# Patient Record
Sex: FEMALE | Race: WHITE | NOT HISPANIC OR LATINO | Employment: OTHER | URBAN - METROPOLITAN AREA
[De-identification: names, ages, dates, MRNs, and addresses within clinical notes are randomized per-mention and may not be internally consistent; named-entity substitution may affect disease eponyms.]

---

## 2019-06-13 ENCOUNTER — TRANSFERRED RECORDS (OUTPATIENT)
Dept: HEALTH INFORMATION MANAGEMENT | Facility: CLINIC | Age: 84
End: 2019-06-13

## 2019-06-24 ENCOUNTER — TELEPHONE (OUTPATIENT)
Dept: OPHTHALMOLOGY | Facility: CLINIC | Age: 84
End: 2019-06-24

## 2019-06-24 NOTE — TELEPHONE ENCOUNTER
Spoke with patient to set up new glaucoma eval, and was directed to pt's daughter to select dates.   Left message with daughter, gave glaucoma desk call back number. Looking at Monday, July 8 or 15.  Huma Beard COA 2:38 PM June 24, 2019

## 2019-07-22 ENCOUNTER — OFFICE VISIT (OUTPATIENT)
Dept: OPHTHALMOLOGY | Facility: CLINIC | Age: 84
End: 2019-07-22
Attending: OPHTHALMOLOGY
Payer: COMMERCIAL

## 2019-07-22 DIAGNOSIS — H40.1433 CAPSULAR GLAUCOMA OF BOTH EYES WITH PSEUDOEXFOLIATION (PXF) OF LENS, SEVERE STAGE: Primary | ICD-10-CM

## 2019-07-22 PROCEDURE — 92083 EXTENDED VISUAL FIELD XM: CPT | Mod: ZF | Performed by: OPHTHALMOLOGY

## 2019-07-22 PROCEDURE — G0463 HOSPITAL OUTPT CLINIC VISIT: HCPCS | Mod: ZF

## 2019-07-22 PROCEDURE — 92020 GONIOSCOPY: CPT | Mod: ZF | Performed by: OPHTHALMOLOGY

## 2019-07-22 PROCEDURE — 92133 CPTRZD OPH DX IMG PST SGM ON: CPT | Mod: ZF | Performed by: OPHTHALMOLOGY

## 2019-07-22 RX ORDER — BRIMONIDINE TARTRATE, TIMOLOL MALEATE 2; 5 MG/ML; MG/ML
SOLUTION/ DROPS OPHTHALMIC
COMMUNITY
Start: 2019-06-08 | End: 2019-09-30

## 2019-07-22 RX ORDER — CHLORAL HYDRATE 500 MG
2 CAPSULE ORAL DAILY
COMMUNITY

## 2019-07-22 RX ORDER — BIMATOPROST 0.1 MG/ML
SOLUTION/ DROPS OPHTHALMIC
COMMUNITY
Start: 2019-07-08

## 2019-07-22 RX ORDER — AMLODIPINE BESYLATE 5 MG/1
5 TABLET ORAL DAILY
COMMUNITY

## 2019-07-22 RX ORDER — ERYTHROMYCIN 5 MG/G
1 OINTMENT OPHTHALMIC 3 TIMES DAILY
COMMUNITY
Start: 2019-07-08

## 2019-07-22 ASSESSMENT — REFRACTION_WEARINGRX
OS_ADD: +2.50
OS_AXIS: 155
OS_CYLINDER: +1.00
OD_ADD: +2.50
OS_SPHERE: -1.50
OD_AXIS: 145
OD_CYLINDER: +1.00
OD_SPHERE: -1.25

## 2019-07-22 ASSESSMENT — VISUAL ACUITY
CORRECTION_TYPE: GLASSES
METHOD: SNELLEN - LINEAR
OS_CC: 20/400 ECC
OD_CC: 20/500 ECC

## 2019-07-22 ASSESSMENT — CONF VISUAL FIELD
OS_SUPERIOR_NASAL_RESTRICTION: 3
OS_INFERIOR_NASAL_RESTRICTION: 3
OD_SUPERIOR_TEMPORAL_RESTRICTION: 2
OS_SUPERIOR_TEMPORAL_RESTRICTION: 3
OS_INFERIOR_TEMPORAL_RESTRICTION: 3
OD_SUPERIOR_NASAL_RESTRICTION: 1
OD_INFERIOR_TEMPORAL_RESTRICTION: 1
OD_INFERIOR_NASAL_RESTRICTION: 1

## 2019-07-22 ASSESSMENT — SLIT LAMP EXAM - LIDS
COMMENTS: NORMAL
COMMENTS: NORMAL

## 2019-07-22 ASSESSMENT — GONIOSCOPY
OS_NASAL: OPEN WITH MILD PIGMENT
OD_TEMPORAL: OPEN WITH MILD PIGMENT
OS_SUPERIOR: OPEN WITH MILD PIGMENT
OS_INFERIOR: OPEN WITH MILD PIGMENT
OD_NASAL: OPEN WITH MILD PIGMENT
OD_SUPERIOR: OPEN WITH MILD PIGMENT
OD_INFERIOR: OPEN WITH MILD PIGMENT
OS_TEMPORAL: OPEN WITH MILD PIGMENT

## 2019-07-22 ASSESSMENT — TONOMETRY
IOP_METHOD: APPLANATION
OS_IOP_MMHG: 24
OD_IOP_MMHG: 16

## 2019-07-22 ASSESSMENT — PACHYMETRY
OD_CT(UM): 526
OS_CT(UM): 534

## 2019-07-22 NOTE — LETTER
Dear Dr. Adria Naranjo    I had the pleasure of seeing Izabella Amezcua on July 22, 2019 at the [facility].  My exam findings are as follows:    Visual Acuity (Snellen - Linear)       Right Left    Dist cc 20/500 ecc 20/400 ecc    Dist ph cc NI NI    Correction:  Glasses         Tonometry (Applanation, 7:52 AM)       Right Left    Pressure 16 24         Main Ophthalmology Exam     Slit Lamp Exam       Right Left    Lids/Lashes Normal Normal    Conjunctiva/Sclera Baerveldt covered with trab or cyst adjacent scarred at superior limbus    Cornea calcium in central cornea Clear    Anterior Chamber Deep and quiet Deep and quiet    Iris Pseudoexfoliation material Pseudoexfoliation material    Lens Posterior chamber intraocular lens Posterior chamber intraocular lens          Fundus Exam       Right Left    Disc loss of entire temporal rim loss of entire temporal rim    Macula drusen and pigment change drusen and pigment change              My assessment and plan is:  CC: Referred by Dr Naranjo, worsening vision for the past 6-9 months left eye     HPI: History of Pseudoexfoliation glaucoma.  Poor vision since cataract extraction right eye many years ago.  Used vision left eye to read but needed magnification and reading machine for the past 3 years     PAST OCULAR HISTORY/PROCEDURES:  Retinal detachment repair with scleral buckle, left eye 1989 ([facility] in [place])   Surgery with subsequent retinal laser x 2  Glaucoma diagnosed right eye 1989 and left eye 2000  Cataract extraction with intraocular lens    Right eye 1996    Left eye 1997  Selective laser trabeculoplasty (SLT) right eye 2006 ([facility])  Selective laser trabeculoplasty (SLT) both eyes 2010  Baerveldt right eye 2013 ([facility])  Laser both eyes 2016 ([facility])  Selective laser trabeculoplasty (SLT) left eye 2018 ([facility])   Micropulse cyclophotocoagulation left eye 2/19/19      MAXIMUM INTRAOCULAR PRESSURE:  Unknown  right eye / 38 left eye     MEDICATIONS:  Combigan both eyes twice a day  Lumigan both eyes at bedtime  Pilocarpine left eye three times a day  Erythromycin three times a day right eye (not using left eye )  artificial tears (Celluvisc) right eye  Preservision     ANTICOAGULANTS/ANTIPLATELETS/FLOMAX: baby aspirin and fish oil    PMH:  Systemic hypertension     FAMILY/SOCIAL HISTORY:       Family history of glaucoma, mother (lived to 106) and 2 brothers     TESTING:  Octopus visual field LVC   Right eye temporal island remains   Left eye dense inferior nasal step and arcuate, superior arcuate, threatened fixation    OCT retinal nerve fiber layer    Right eye thin except nasally   Left eye superior and inferior thinning, cystoid macular edema left eye     EXAM:  slightly thin central corneal thickness     IMPRESSION:  Pseudoexfoliation glaucoma both eyes, adv   Age related macular degeneration now with macular edema left eye and worsening vision  Intraocular pressure controlled right eye  Intraocular pressure too high left eye     PLAN:  Needs to follow up with VRS for macular edema with worsening visual acuity   Stop odell for now and try Rhopressa left eye once a day  (on her formulary per EPIC), given sample  Consider Schocket tube left eye for intraocular pressure control, need to confirm buckle left eye (poor dilation, could use BScan)    Attending Physician Attestation:  Complete documentation of historical and exam elements from today's encounter can be found in the full encounter summary report (not reduplicated in this progress note). I personally obtained the chief complaint(s) and history of present illness. I confirmed and edited asnecessary the review of systems, past medical/surgical history, family history, social history, and examination findings as documented by others; and I examined the patient myself. I personally reviewed the relevant tests, images, and reports as documented above. I formulated and  edited as necessary the assessment and plan and discussed the findings and management plan with the patient and family.  - Diamond Hopper MD 9:24 AM 7/22/2019          Sincerely,   Diamond Hopper MD  Glaucoma   [employer] Professor of Ophthalmology

## 2019-07-22 NOTE — NURSING NOTE
Chief Complaints and History of Present Illnesses   Patient presents with     Glaucoma     Chief Complaint(s) and History of Present Illness(es)     Glaucoma     Laterality: both eyes    Quality: blurred (Dim va)    Treatment side effects: Gets very tired    Compliance with Treatment: always              Comments     Thelma MARKS 7:36 AM July 22, 2019

## 2019-07-22 NOTE — PROGRESS NOTES
CC: Referred by Dr Naranjo, worsening vision for the past 6-9 months left eye     HPI: History of Pseudoexfoliation glaucoma.  Poor vision since cataract extraction right eye many years ago.  Used vision left eye to read but needed magnification and reading machine for the past 3 years     PAST OCULAR HISTORY/PROCEDURES:  Retinal detachment repair with scleral buckle, left eye 1989 ([facility] in [place])   Surgery with subsequent retinal laser x 2  Glaucoma diagnosed right eye 1989 and left eye 2000  Cataract extraction with intraocular lens    Right eye 1996    Left eye 1997  Selective laser trabeculoplasty (SLT) right eye 2006 ([facility])  Selective laser trabeculoplasty (SLT) both eyes 2010  Baerveldt right eye 2013 ([facility])  Laser both eyes 2016 ([facility])  Selective laser trabeculoplasty (SLT) left eye 2018 ([facility])   Micropulse cyclophotocoagulation left eye 2/19/19      MAXIMUM INTRAOCULAR PRESSURE:  Unknown right eye / 38 left eye     MEDICATIONS:  Combigan both eyes twice a day  Lumigan both eyes at bedtime  Pilocarpine left eye three times a day  Erythromycin three times a day right eye (not using left eye )  artificial tears (Celluvisc) right eye  Preservision     ANTICOAGULANTS/ANTIPLATELETS/FLOMAX: baby aspirin and fish oil    PMH:  Systemic hypertension     FAMILY/SOCIAL HISTORY:       Family history of glaucoma, mother (lived to [facility]) and 2 brothers     TESTING:  Octopus visual field LVC   Right eye temporal island remains   Left eye dense inferior nasal step and arcuate, superior arcuate, threatened fixation    OCT retinal nerve fiber layer    Right eye thin except nasally   Left eye superior and inferior thinning, cystoid macular edema left eye     EXAM:  slightly thin central corneal thickness     IMPRESSION:  Pseudoexfoliation glaucoma both eyes, adv   Age related macular degeneration now with macular edema left eye and worsening vision  Intraocular pressure controlled right  eye  Intraocular pressure too high left eye     PLAN:  Needs to follow up with VRS for macular edema with worsening visual acuity   Stop odell for now and try Rhopressa left eye once a day  (on her formulary per EPIC), given sample  Consider Schocket tube left eye for intraocular pressure control, need to confirm buckle left eye (poor dilation, could use BScan)    Attending Physician Attestation:  Complete documentation of historical and exam elements from today's encounter can be found in the full encounter summary report (not reduplicated in this progress note). I personally obtained the chief complaint(s) and history of present illness. I confirmed and edited asnecessary the review of systems, past medical/surgical history, family history, social history, and examination findings as documented by others; and I examined the patient myself. I personally reviewed the relevant tests, images, and reports as documented above. I formulated and edited as necessary the assessment and plan and discussed the findings and management plan with the patient and family.  - Diamond Hopper MD 9:24 AM 7/22/2019

## 2019-07-25 ENCOUNTER — TRANSFERRED RECORDS (OUTPATIENT)
Dept: HEALTH INFORMATION MANAGEMENT | Facility: CLINIC | Age: 84
End: 2019-07-25

## 2019-07-31 ENCOUNTER — TRANSFERRED RECORDS (OUTPATIENT)
Dept: HEALTH INFORMATION MANAGEMENT | Facility: CLINIC | Age: 84
End: 2019-07-31

## 2019-08-09 ENCOUNTER — TELEPHONE (OUTPATIENT)
Dept: OPHTHALMOLOGY | Facility: CLINIC | Age: 84
End: 2019-08-09

## 2019-08-09 ENCOUNTER — OFFICE VISIT (OUTPATIENT)
Dept: OPHTHALMOLOGY | Facility: CLINIC | Age: 84
End: 2019-08-09
Attending: OPHTHALMOLOGY
Payer: COMMERCIAL

## 2019-08-09 DIAGNOSIS — H40.1433 CAPSULAR GLAUCOMA OF BOTH EYES WITH PSEUDOEXFOLIATION (PXF) OF LENS, SEVERE STAGE: ICD-10-CM

## 2019-08-09 DIAGNOSIS — Z96.9 PRESENCE OF FUNCTIONAL IMPLANT: Primary | ICD-10-CM

## 2019-08-09 PROCEDURE — 76512 OPH US DX B-SCAN: CPT | Mod: ZF,RT | Performed by: OPHTHALMOLOGY

## 2019-08-09 PROCEDURE — G0463 HOSPITAL OUTPT CLINIC VISIT: HCPCS | Mod: ZF

## 2019-08-09 RX ORDER — CARBOXYMETHYLCELLULOSE SODIUM 10 MG/ML
1 GEL OPHTHALMIC
COMMUNITY

## 2019-08-09 RX ORDER — NETARSUDIL 0.2 MG/ML
1 SOLUTION/ DROPS OPHTHALMIC; TOPICAL DAILY
COMMUNITY
Start: 2019-07-25

## 2019-08-09 RX ORDER — ANTIOX #8/OM3/DHA/EPA/LUT/ZEAX 250-2.5 MG
1 CAPSULE ORAL 2 TIMES DAILY
COMMUNITY

## 2019-08-09 ASSESSMENT — CONF VISUAL FIELD
OS_INFERIOR_NASAL_RESTRICTION: 3
OD_INFERIOR_TEMPORAL_RESTRICTION: 1
OD_SUPERIOR_TEMPORAL_RESTRICTION: 2
OS_SUPERIOR_TEMPORAL_RESTRICTION: 3
OD_INFERIOR_NASAL_RESTRICTION: 1
OS_INFERIOR_TEMPORAL_RESTRICTION: 3
OS_SUPERIOR_NASAL_RESTRICTION: 3
OD_SUPERIOR_NASAL_RESTRICTION: 1

## 2019-08-09 ASSESSMENT — VISUAL ACUITY
METHOD: SNELLEN - LINEAR
OD_CC: 20/400
OS_CC: 20/400

## 2019-08-09 ASSESSMENT — REFRACTION_WEARINGRX
OD_ADD: +2.50
OD_CYLINDER: +1.00
OS_ADD: +2.50
OS_SPHERE: -1.50
OS_CYLINDER: +1.00
OD_AXIS: 145
OS_AXIS: 155
OD_SPHERE: -1.25

## 2019-08-09 ASSESSMENT — SLIT LAMP EXAM - LIDS
COMMENTS: NORMAL
COMMENTS: NORMAL

## 2019-08-09 ASSESSMENT — TONOMETRY
IOP_METHOD: APPLANATION
OS_IOP_MMHG: 22
OD_IOP_MMHG: 14

## 2019-08-09 NOTE — TELEPHONE ENCOUNTER
Patient is scheduled for surgery with Dr. Hopper      Spoke or left message with: patient and her Daughter    Date of Surgery: 8/20/19    Location: [facility]    Informed patient they will need an adult  Yes    Pre-op with surgeon (if applicable): JACK    H&P: Scheduled with Dr. Kervin Miranda patient will call and schedule appointment.    Additional imaging/appointments: scheduled post op appointments.    Surgery packet: gave to patient 8/9/19    Additional comments: Advised RN will call 1 - 3 days prior with arrival time and instructions.

## 2019-08-09 NOTE — PROGRESS NOTES
CC: Referred by Dr Naranjo, worsening vision for the past 6-9 months left eye     HPI: History of Pseudoexfoliation glaucoma.  Poor vision since cataract extraction right eye many years ago.  Used vision left eye to read but needed magnification and reading machine for the past 3 years   Saw Dr Zimmerman early August 2019 and had injection of Avastin left eye, elected no treatment for now right eye due to poor visual acuity from glaucoma ( intraocular pressure 24 at that visit left eye )    PAST OCULAR HISTORY/PROCEDURES:  Retinal detachment repair with scleral buckle, left eye 1989 ([facility] in [place])   Surgery with subsequent retinal laser x 2  Glaucoma diagnosed right eye 1989 and left eye 2000  Cataract extraction with intraocular lens    Right eye 1996    Left eye 1997  Selective laser trabeculoplasty (SLT) right eye 2006 ([facility])  Selective laser trabeculoplasty (SLT) both eyes 2010  Baerveldt right eye 2013 ([facility])  Laser both eyes 2016 ([facility])  Selective laser trabeculoplasty (SLT) left eye 2018 ([facility])   Micropulse cyclophotocoagulation left eye 2/19/19  Avastin left eye with Dr Zimmerman    MAXIMUM INTRAOCULAR PRESSURE:  Unknown right eye / 38 left eye     MEDICATIONS:  Combigan both eyes twice a day  Lumigan both eyes at bedtime   changed to Rhopressa 7/22/19 with minimal effectErythromycin three times a day right eye (not using left eye )  artificial tears (Celluvisc) right eye  Preservision     ANTICOAGULANTS/ANTIPLATELETS/FLOMAX: baby aspirin and fish oil    PMH:  Systemic hypertension     FAMILY/SOCIAL HISTORY:       Family history of glaucoma, mother (lived to [facility]) and 2 brothers     TESTING:  Octopus visual field LVC   Right eye temporal island remains   Left eye dense inferior nasal step and arcuate, superior arcuate, threatened fixation    OCT retinal nerve fiber layer    Right eye thin except nasally   Left eye superior and inferior thinning, cystoid macular edema left eye      BScan left eye     EXAM:  slightly thin central corneal thickness     IMPRESSION:  Pseudoexfoliation glaucoma both eyes, adv   Age related macular degeneration now with macular edema left eye and worsening vision  Intraocular pressure controlled right eye  Intraocular pressure too high left eye     PLAN:  Needs to follow up with VRS for macular edema with worsening visual acuity   Stop odell for now and try Rhopressa left eye once a day  (on her formulary per EPIC), given sample  Consider Schocket tube left eye for intraocular pressure control, need to confirm buckle left eye (poor dilation, could use BScan)  OK with left eye patched overnight    Schocket left eye with corneal patch graft, supero-nasal  Out patient  Block  Risks discussed    Attending Physician Attestation:  Complete documentation of historical and exam elements from today's encounter can be found in the full encounter summary report (not reduplicated in this progress note). I personally obtained the chief complaint(s) and history of present illness. I confirmed and edited asnecessary the review of systems, past medical/surgical history, family history, social history, and examination findings as documented by others; and I examined the patient myself. I personally reviewed the relevant tests, images, and reports as documented above. I formulated and edited as necessary the assessment and plan and discussed the findings and management plan with the patient and family.  - Diamond Hopper MD 11:29 AM 8/9/2019

## 2019-08-16 SDOH — HEALTH STABILITY: MENTAL HEALTH: HOW OFTEN DO YOU HAVE A DRINK CONTAINING ALCOHOL?: NEVER

## 2019-08-19 ENCOUNTER — ANESTHESIA EVENT (OUTPATIENT)
Dept: SURGERY | Facility: AMBULATORY SURGERY CENTER | Age: 84
End: 2019-08-19

## 2019-08-19 RX ORDER — PREDNISOLONE ACETATE 10 MG/ML
1 SUSPENSION/ DROPS OPHTHALMIC 4 TIMES DAILY
Qty: 1 BOTTLE | Refills: 3 | Status: SHIPPED | OUTPATIENT
Start: 2019-08-19

## 2019-08-20 ENCOUNTER — TRANSFERRED RECORDS (OUTPATIENT)
Dept: HEALTH INFORMATION MANAGEMENT | Facility: CLINIC | Age: 84
End: 2019-08-20

## 2019-08-20 ENCOUNTER — HOSPITAL ENCOUNTER (OUTPATIENT)
Facility: AMBULATORY SURGERY CENTER | Age: 84
End: 2019-08-20
Attending: OPHTHALMOLOGY
Payer: COMMERCIAL

## 2019-08-20 ENCOUNTER — ANESTHESIA (OUTPATIENT)
Dept: SURGERY | Facility: AMBULATORY SURGERY CENTER | Age: 84
End: 2019-08-20

## 2019-08-20 VITALS
SYSTOLIC BLOOD PRESSURE: 138 MMHG | OXYGEN SATURATION: 100 % | HEIGHT: 64 IN | DIASTOLIC BLOOD PRESSURE: 67 MMHG | HEART RATE: 68 BPM | TEMPERATURE: 98.2 F | WEIGHT: 137 LBS | RESPIRATION RATE: 16 BRPM | BODY MASS INDEX: 23.39 KG/M2

## 2019-08-20 DIAGNOSIS — H40.1433 CAPSULAR GLAUCOMA OF BOTH EYES WITH PSEUDOEXFOLIATION (PXF) OF LENS, SEVERE STAGE: Primary | ICD-10-CM

## 2019-08-20 DEVICE — IMPLANTABLE DEVICE: Type: IMPLANTABLE DEVICE | Site: EYE | Status: FUNCTIONAL

## 2019-08-20 DEVICE — EYE IMP GRAFT VISIONGRAFT CORNEA 1/2MOON 9MM CO301AL-90: Type: IMPLANTABLE DEVICE | Site: EYE | Status: FUNCTIONAL

## 2019-08-20 RX ORDER — SODIUM CHLORIDE, SODIUM LACTATE, POTASSIUM CHLORIDE, CALCIUM CHLORIDE 600; 310; 30; 20 MG/100ML; MG/100ML; MG/100ML; MG/100ML
INJECTION, SOLUTION INTRAVENOUS CONTINUOUS
Status: DISCONTINUED | OUTPATIENT
Start: 2019-08-20 | End: 2019-08-21 | Stop reason: HOSPADM

## 2019-08-20 RX ORDER — LIDOCAINE HYDROCHLORIDE 20 MG/ML
INJECTION, SOLUTION INFILTRATION; PERINEURAL PRN
Status: DISCONTINUED | OUTPATIENT
Start: 2019-08-20 | End: 2019-08-20

## 2019-08-20 RX ORDER — LIDOCAINE 40 MG/G
CREAM TOPICAL
Status: DISCONTINUED | OUTPATIENT
Start: 2019-08-20 | End: 2019-08-20 | Stop reason: HOSPADM

## 2019-08-20 RX ORDER — ONDANSETRON 4 MG/1
4 TABLET, ORALLY DISINTEGRATING ORAL EVERY 30 MIN PRN
Status: DISCONTINUED | OUTPATIENT
Start: 2019-08-20 | End: 2019-08-21 | Stop reason: HOSPADM

## 2019-08-20 RX ORDER — BALANCED SALT SOLUTION 6.4; .75; .48; .3; 3.9; 1.7 MG/ML; MG/ML; MG/ML; MG/ML; MG/ML; MG/ML
SOLUTION OPHTHALMIC PRN
Status: DISCONTINUED | OUTPATIENT
Start: 2019-08-20 | End: 2019-08-20 | Stop reason: HOSPADM

## 2019-08-20 RX ORDER — NALOXONE HYDROCHLORIDE 0.4 MG/ML
.1-.4 INJECTION, SOLUTION INTRAMUSCULAR; INTRAVENOUS; SUBCUTANEOUS
Status: DISCONTINUED | OUTPATIENT
Start: 2019-08-20 | End: 2019-08-21 | Stop reason: HOSPADM

## 2019-08-20 RX ORDER — NEOMYCIN SULFATE, POLYMYXIN B SULFATE, AND DEXAMETHASONE 3.5; 10000; 1 MG/G; [USP'U]/G; MG/G
OINTMENT OPHTHALMIC PRN
Status: DISCONTINUED | OUTPATIENT
Start: 2019-08-20 | End: 2019-08-20 | Stop reason: HOSPADM

## 2019-08-20 RX ORDER — PROPOFOL 10 MG/ML
INJECTION, EMULSION INTRAVENOUS PRN
Status: DISCONTINUED | OUTPATIENT
Start: 2019-08-20 | End: 2019-08-20

## 2019-08-20 RX ORDER — SODIUM CHLORIDE, SODIUM LACTATE, POTASSIUM CHLORIDE, CALCIUM CHLORIDE 600; 310; 30; 20 MG/100ML; MG/100ML; MG/100ML; MG/100ML
INJECTION, SOLUTION INTRAVENOUS CONTINUOUS
Status: DISCONTINUED | OUTPATIENT
Start: 2019-08-20 | End: 2019-08-20 | Stop reason: HOSPADM

## 2019-08-20 RX ORDER — DEXAMETHASONE SODIUM PHOSPHATE 4 MG/ML
INJECTION, SOLUTION INTRA-ARTICULAR; INTRALESIONAL; INTRAMUSCULAR; INTRAVENOUS; SOFT TISSUE PRN
Status: DISCONTINUED | OUTPATIENT
Start: 2019-08-20 | End: 2019-08-20 | Stop reason: HOSPADM

## 2019-08-20 RX ORDER — ONDANSETRON 2 MG/ML
4 INJECTION INTRAMUSCULAR; INTRAVENOUS EVERY 30 MIN PRN
Status: DISCONTINUED | OUTPATIENT
Start: 2019-08-20 | End: 2019-08-21 | Stop reason: HOSPADM

## 2019-08-20 RX ORDER — MOXIFLOXACIN IN NACL,ISO-OS/PF 0.3MG/0.3
SYRINGE (ML) INTRAOCULAR PRN
Status: DISCONTINUED | OUTPATIENT
Start: 2019-08-20 | End: 2019-08-20 | Stop reason: HOSPADM

## 2019-08-20 RX ORDER — OXYCODONE HYDROCHLORIDE 5 MG/1
5 TABLET ORAL EVERY 4 HOURS PRN
Status: DISCONTINUED | OUTPATIENT
Start: 2019-08-20 | End: 2019-08-21 | Stop reason: HOSPADM

## 2019-08-20 RX ADMIN — LIDOCAINE HYDROCHLORIDE 50 MG: 20 INJECTION, SOLUTION INFILTRATION; PERINEURAL at 12:36

## 2019-08-20 RX ADMIN — PROPOFOL 50 MG: 10 INJECTION, EMULSION INTRAVENOUS at 12:36

## 2019-08-20 RX ADMIN — SODIUM CHLORIDE, SODIUM LACTATE, POTASSIUM CHLORIDE, CALCIUM CHLORIDE: 600; 310; 30; 20 INJECTION, SOLUTION INTRAVENOUS at 12:16

## 2019-08-20 ASSESSMENT — MIFFLIN-ST. JEOR: SCORE: 1011.43

## 2019-08-20 NOTE — OP NOTE
PREOPERATIVE DIAGNOSES: Pseudoexfoliation glaucoma left eye     POSTOPERATIVE DIAGNOSES: same     PROCEDURE: Placement of Schocket tube with corneal patch graft, left eye.   SURGEON: Diamond Hopper MD   ASSISTANT: none.   ANESTHESIA: Monitored anesthesia with regional block.   COMPLICATIONS: none     PROCEDURE: The patient was brought to the operating room where a retrobulbar block was given. The left eye was prepped and draped in the usual sterile manner and a 7-0 Vicryl traction suture was placed in the superior nasal cornea. An incision was made through conjunctiva and Tenon's 6 mm posterior to the limbus and the dissection was carried forward to the limbus and posteriorly until the scleral buckle was located.  A section of a Corrales tube was trimmed. The tube was tied offf with a 7-0 Vicryl suture. The capsule over the buckle was incised, a cyclodialysis spatula was passed between the buckle and the capsule along the superior buckle. The tube was fixed to the globe with an interrupted 9-0 prolene suture.  The tube was trimmed and placed into the eye through a 23 gauge needle tract. The tube was covered with a VisionGraft corneal patch graft The corneal graft was sutured to the globe with a 7-0 Vicryl mattress suture. Tenon's and conjunctiva were  closed with a running 9-0 Vicryl suture on a vascular needle. The wound was checked and found to be free from leaks. A paracentesis tract was made. Moxifloxicin was placed into the anterior chamber.  Dexamethasone 0.5 mL was injected subconjunctivally. The eye was patched and shielded with Maxitrol ointment and the patient left the operating room in good condition.

## 2019-08-20 NOTE — ANESTHESIA CARE TRANSFER NOTE
Patient: Izabella Amezcua    Procedure(s):  Left Eye Schocket with Corneal Patch Graft    Diagnosis: Uncontrolled Pressure  Diagnosis Additional Information: No value filed.    Anesthesia Type:   MAC     Note:  Airway :Room Air  Patient transferred to:Phase II  Comments: VSS/WNL. Responds well.Handoff Report: Identifed the Patient, Identified the Reponsible Provider, Reviewed the pertinent medical history, Discussed the surgical course, Reviewed Intra-OP anesthesia mangement and issues during anesthesia, Set expectations for post-procedure period and Allowed opportunity for questions and acknowledgement of understanding      Vitals: (Last set prior to Anesthesia Care Transfer)    CRNA VITALS  8/20/2019 1310 - 8/20/2019 1344      8/20/2019             Pulse:  71    Ht Rate:  71    SpO2:  100 %    Resp Rate (set):  10                Electronically Signed By: WINTER Giles CRNA  August 20, 2019  1:44 PM

## 2019-08-20 NOTE — ANESTHESIA PREPROCEDURE EVALUATION
Anesthesia Pre-Procedure Evaluation    Patient: Izabella Amezcua   MRN:     [id number] Gender:   female   Age:    89 year old :      1926        Preoperative Diagnosis: Uncontrolled Pressure   Procedure(s):  Left Eye Schocket with Corneal Patch Graft     Past Medical History:   Diagnosis Date     Blepharitis      Cerebral artery occlusion with cerebral infarction (H)     TIA when age 80     Dry eye      Glaucoma (increased eye pressure)      Hyperlipemia      Hypertension      Osteopenia      Pseudophakia      PVD (posterior vitreous detachment)       Past Surgical History:   Procedure Laterality Date     CATARACT IOL, RT/LT Bilateral      RETINAL REATTACHMENT       SELECTIVE LASER TRABECULOPLASTY (SLT) OD (RIGHT EYE)       SELECTIVE LASER TRABECULOPLASTY (SLT) OS (LEFT EYE)       YAG CAPSULOTOMY OD (RIGHT EYE)            Anesthesia Evaluation     . Pt has had prior anesthetic. Type: General    No history of anesthetic complications          ROS/MED HX    ENT/Pulmonary:  - neg pulmonary ROS     Neurologic:     (+)TIA     Cardiovascular:     (+) hypertension----. : . . . :. .       METS/Exercise Tolerance:  >4 METS   Hematologic:  - neg hematologic  ROS       Musculoskeletal:  - neg musculoskeletal ROS       GI/Hepatic:  - neg GI/hepatic ROS       Renal/Genitourinary:  - ROS Renal section negative       Endo:  - neg endo ROS       Psychiatric:  - neg psychiatric ROS       Infectious Disease:  - neg infectious disease ROS       Malignancy:      - no malignancy   Other:                         PHYSICAL EXAM:   Mental Status/Neuro: A/A/O   Airway: Facies: Feasible  Mallampati: I  Mouth/Opening: Full  TM distance: > 6 cm  Neck ROM: Full   Respiratory: Auscultation: CTAB     Resp. Rate: Normal     Resp. Effort: Normal      CV: Rhythm: Regular  Rate: Age appropriate  Heart: Normal Sounds  Edema: None   Comments:      Dental: Normal Dentition                LABS:  CBC: No results found for: WBC, HGB, HCT,  "PLT  BMP: No results found for: NA, POTASSIUM, CHLORIDE, CO2, BUN, CR, GLC  COAGS: No results found for: PTT, INR, FIBR  POC: No results found for: BGM, HCG, HCGS  OTHER: No results found for: PH, LACT, A1C, MIAN, PHOS, MAG, ALBUMIN, PROTTOTAL, ALT, AST, GGT, ALKPHOS, BILITOTAL, BILIDIRECT, LIPASE, AMYLASE, OSEAS, TSH, T4, T3, CRP, SED     Preop Vitals    BP Readings from Last 3 Encounters:   08/20/19 139/57    Pulse Readings from Last 3 Encounters:   08/20/19 64      Resp Readings from Last 3 Encounters:   08/20/19 14    SpO2 Readings from Last 3 Encounters:   08/20/19 97%      Temp Readings from Last 1 Encounters:   08/20/19 36.4  C (97.6  F) (Oral)    Ht Readings from Last 1 Encounters:   08/20/19 1.626 m (5' 4\")      Wt Readings from Last 1 Encounters:   08/20/19 62.1 kg (137 lb)    Estimated body mass index is 23.52 kg/m  as calculated from the following:    Height as of this encounter: 1.626 m (5' 4\").    Weight as of this encounter: 62.1 kg (137 lb).     LDA:  Peripheral IV 08/20/19 Right Hand (Active)   Site Assessment WDL 8/20/2019 11:19 AM   Line Status Infusing 8/20/2019 11:19 AM   Phlebitis Scale 0-->no symptoms 8/20/2019 11:19 AM   Infiltration Scale 0 8/20/2019 11:19 AM   Extravasation? No 8/20/2019 11:19 AM   Dressing Intervention New dressing  8/20/2019 11:19 AM   Reason Not Rotated Anticipated discharge 8/20/2019 11:19 AM   Number of days: 0        Assessment:   ASA SCORE: 2    H&P: History and physical reviewed and following examination; no interval change.   Smoking Status:  Non-Smoker/Unknown   NPO Status: NPO Appropriate     Plan:   Anes. Type:  MAC   Pre-Medication: None   Induction:  N/a   Airway: Native Airway   Access/Monitoring: PIV   Maintenance: N/a     Postop Plan:   Postop Pain: None  Postop Sedation/Airway: Not planned  Disposition: Outpatient     PONV Management:   Adult Risk Factors: Female, Non-Smoker   Prevention: Ondansetron, Dexamethasone     CONSENT: Direct conversation   Plan " and risks discussed with: Patient                      Farnc Floyd MD, MD

## 2019-08-20 NOTE — DISCHARGE INSTRUCTIONS
[facility]  Home Care Following Anesthesia  For 24 hours after surgery:  1. Get plenty of rest.  A responsible adult must stay with you for at least 24 hours after you leave the surgery center.  2. Do not drive or use heavy equipment.  If you have weakness or tingling, don't drive or use heavy equipment until this feeling goes away.   3. Do not drink alcohol.   4. Avoid strenuous or risky activities.  Ask for help when climbing stairs.  5. You may feel lightheaded.  IF so, sit for a few minutes before standing.  Have someone help you get up.   6. If you have nausea (feel sick to your stomach): Drink only clear liquids such as apple juice, ginger ale, broth or 7-Up.  Rest may also help.  Be sure to drink enough fluids.  Move to a regular diet as you feel able.   7. You may have a slight fever.  Call the doctor if your fever is over 100 F (37.7 C) (taken under the tongue) or lasts longer than 24 hours.  8. You may have a dry mouth, a sore throat, muscle aches or trouble sleeping. These should go away after 24 hours.  9. Do not make important or legal decisions.               Tips for taking pain medications  To get the best pain relief possible, remember these points:    Take pain medications as directed, before pain becomes severe.    Pain medication can upset your stomach: taking it with food may help.    Constipation is a common side effect of pain medication. Drink plenty of  fluids.    Eat foods high in fiber. Take a stool softener if recommended by your doctor or pharmacist.    Do not drink alcohol, drive or operate machinery while taking pain medications.    Ask about other ways to control pain, such as with heat, ice or relaxation.    Tylenol/Acetaminophen Consumption  To help encourage the safe use of acetaminophen, the makers of TYLENOL  have lowered the maximum daily dose for single-ingredient Extra Strength TYLENOL  (acetaminophen) products sold in the [place] from 8  pills per day (4,000 mg) to 6 pills per day (3,000 mg). The dosing interval has also changed from 2 pills every 4-6 hours to 2 pills every 6 hours.    If you feel your pain relief is insufficient, you may take Tylenol/Acetaminophen in addition to your narcotic pain medication.     Be careful not to exceed 3,000 mg of Tylenol/Acetaminophen in a 24 hour period from all sources.    If you are taking extra strength Tylenol/acetaminophen (500 mg), the maximum dose is 6 tablets in 24 hours.    If you are taking regular strength acetaminophen (325 mg), the maximum dose is 9 tablets in 24 hours.    Call a doctor for any of the followin. Signs of infection (fever, growing tenderness at the surgery site, a large amount of drainage or bleeding, severe pain, foul-smelling drainage, redness, swelling).  2. It has been over 8 to 10 hours since surgery and you are still not able to urinate (pass water).  3. Headache for over 24 hours.    Your doctor is:     Dr. Diamond Hopper, Opthalmology: [phone]               Or dial [phone] and ask for the resident on call for:  Ophthalmology  For emergency care, call the:  [facility]:  [phone] (TTY for hearing impaired: [phone])

## 2019-08-20 NOTE — ANESTHESIA POSTPROCEDURE EVALUATION
Anesthesia POST Procedure Evaluation    Patient: Izabella Amezcua   MRN:     [id number] Gender:   female   Age:    89 year old :      1926        Preoperative Diagnosis: Uncontrolled Pressure   Procedure(s):  Left Eye Schocket with Corneal Patch Graft   Postop Comments: No value filed.       Anesthesia Type:  Not documented  MAC    Reportable Event: NO     PAIN: Uncomplicated   Sign Out status: Comfortable, Well controlled pain     PONV: No PONV   Sign Out status:  No Nausea or Vomiting     Neuro/Psych: Uneventful perioperative course   Sign Out Status: Preoperative baseline; Age appropriate mentation     Airway/Resp.: Uneventful perioperative course   Sign Out Status: Non labored breathing, age appropriate RR; Resp. Status within EXPECTED Parameters     CV: Uneventful perioperative course   Sign Out status: Appropriate BP and perfusion indices; Appropriate HR/Rhythm     Disposition:   Sign Out in:  PACU  Disposition:  Phase II; Home  Recovery Course: Uneventful  Follow-Up: Not required           Last Anesthesia Record Vitals:  CRNA VITALS  2019 1310 - 2019 1410      2019             Pulse:  71    Ht Rate:  71    SpO2:  100 %    Resp Rate (set):  10          Last PACU Vitals:  Vitals Value Taken Time   BP     Temp     Pulse     Resp     SpO2     Temp src     NIBP 154/75 2019  1:36 PM   Pulse 71 2019  1:39 PM   SpO2 100 % 2019  1:39 PM   Resp     Temp     Ht Rate 71 2019  1:39 PM   Temp 2           Electronically Signed By: Adams Allred DO, 2019, 2:34 PM

## 2019-08-21 ENCOUNTER — OFFICE VISIT (OUTPATIENT)
Dept: OPHTHALMOLOGY | Facility: CLINIC | Age: 84
End: 2019-08-21
Attending: OPHTHALMOLOGY
Payer: COMMERCIAL

## 2019-08-21 DIAGNOSIS — Z48.810 AFTERCARE FOLLOWING SURGERY OF A SENSE ORGAN: Primary | ICD-10-CM

## 2019-08-21 PROCEDURE — G0463 HOSPITAL OUTPT CLINIC VISIT: HCPCS | Mod: ZF

## 2019-08-21 ASSESSMENT — REFRACTION_WEARINGRX
OS_SPHERE: -1.50
OD_ADD: +2.50
OS_ADD: +2.50
OD_CYLINDER: +1.00
OS_AXIS: 155
OD_SPHERE: -1.25
OS_CYLINDER: +1.00
OD_AXIS: 145

## 2019-08-21 ASSESSMENT — VISUAL ACUITY
OD_PH_SC: 20/400
OD_CC: HM
OS_CC: 20/600

## 2019-08-21 ASSESSMENT — SLIT LAMP EXAM - LIDS
COMMENTS: NORMAL
COMMENTS: NORMAL

## 2019-08-21 ASSESSMENT — TONOMETRY
IOP_METHOD: APPLANATION
OS_IOP_MMHG: 32
OD_IOP_MMHG: 16
IOP_METHOD: APPLANATION
OS_IOP_MMHG: 34
IOP_METHOD: TONOPEN
OS_IOP_MMHG: 36
OD_IOP_MMHG: 20

## 2019-08-21 NOTE — PROGRESS NOTES
CC: Schocket left eye with corneal patch graft, supero-nasal 8/20/19  Referred by Dr Naranjo, worsening vision for the past 6-9 months left eye     HPI: History of Pseudoexfoliation glaucoma.  Poor vision since cataract extraction right eye many years ago.  Used vision left eye to read but needed magnification and reading machine for the past 3 years   Saw Dr Zimmerman early August 2019 and had injection of Avastin left eye, elected no treatment for now right eye due to poor visual acuity from glaucoma ( intraocular pressure 24 at that visit left eye )    PAST OCULAR HISTORY/PROCEDURES:  Retinal detachment repair with scleral buckle, left eye 1989 ([facility] in [place])   Surgery with subsequent retinal laser x 2  Glaucoma diagnosed right eye 1989 and left eye 2000  Cataract extraction with intraocular lens    Right eye 1996    Left eye 1997  Selective laser trabeculoplasty (SLT) right eye 2006 ([facility])  Selective laser trabeculoplasty (SLT) both eyes 2010  Baerveldt right eye 2013 ([facility])  Laser both eyes 2016 ([facility])  Selective laser trabeculoplasty (SLT) left eye 2018 ([facility])   Micropulse cyclophotocoagulation left eye 2/19/19  Avastin left eye with Dr Zimmerman    MAXIMUM INTRAOCULAR PRESSURE:  Unknown right eye / 38 left eye     MEDICATIONS:  Combigan both eyes twice a day  Lumigan both eyes at bedtime   changed to Rhopressa 7/22/19 with minimal effectErythromycin three times a day right eye (not using left eye )  artificial tears (Celluvisc) right eye  Preservision     ANTICOAGULANTS/ANTIPLATELETS/FLOMAX: baby aspirin and fish oil    PMH:  Systemic hypertension     FAMILY/SOCIAL HISTORY:       Family history of glaucoma, mother (lived to 106) and 2 brothers     TESTING:  Octopus visual field LVC   Right eye temporal island remains   Left eye dense inferior nasal step and arcuate, superior arcuate, threatened fixation    OCT retinal nerve fiber layer    Right eye thin except nasally   Left eye  superior and inferior thinning, cystoid macular edema left eye     BScan left eye     EXAM:  slightly thin central corneal thickness     IMPRESSION:  Pseudoexfoliation glaucoma both eyes, adv   Age related macular degeneration now with macular edema left eye and worsening vision  Intraocular pressure controlled right eye  Intraocular pressure too high left eye     PLAN:  Needs to follow up with [facility] for macular edema with worsening visual acuity   Continue glaucoma drops both eyes  Postoperative instructions and sheet given including no bending, no lifting more than 10 pounds  Prednisolone four times a day for 1 week, three times a day for 1 week, twice a day for 1 week, once a day for 1 week, then stop.  Return to clinic 1 week    Attending Physician Attestation:  Complete documentation of historical and exam elements from today's encounter can be found in the full encounter summary report (not reduplicated in this progress note). I personally obtained the chief complaint(s) and history of present illness. I confirmed and edited asnecessary the review of systems, past medical/surgical history, family history, social history, and examination findings as documented by others; and I examined the patient myself. I personally reviewed the relevant tests, images, and reports as documented above. I formulated and edited as necessary the assessment and plan and discussed the findings and management plan with the patient and family.  - Diamond Hopper MD 3:14 PM 8/21/2019

## 2019-08-26 ENCOUNTER — OFFICE VISIT (OUTPATIENT)
Dept: OPHTHALMOLOGY | Facility: CLINIC | Age: 84
End: 2019-08-26
Attending: OPHTHALMOLOGY
Payer: COMMERCIAL

## 2019-08-26 DIAGNOSIS — Z48.810 AFTERCARE FOLLOWING SURGERY OF A SENSE ORGAN: Primary | ICD-10-CM

## 2019-08-26 PROCEDURE — G0463 HOSPITAL OUTPT CLINIC VISIT: HCPCS | Mod: ZF

## 2019-08-26 ASSESSMENT — VISUAL ACUITY
CORRECTION_TYPE: GLASSES
METHOD: SNELLEN - LINEAR
OD_CC: 20/400 ECC

## 2019-08-26 ASSESSMENT — REFRACTION_WEARINGRX
OS_ADD: +2.50
OS_SPHERE: -1.50
OD_ADD: +2.50
OD_AXIS: 145
OD_CYLINDER: +1.00
OS_CYLINDER: +1.00
OS_AXIS: 155
OD_SPHERE: -1.25

## 2019-08-26 ASSESSMENT — TONOMETRY
OS_IOP_MMHG: 23
OD_IOP_MMHG: 13
IOP_METHOD: APPLANATION

## 2019-08-26 ASSESSMENT — SLIT LAMP EXAM - LIDS
COMMENTS: NORMAL
COMMENTS: NORMAL

## 2019-08-26 NOTE — PROGRESS NOTES
CC: Schocket left eye with corneal patch graft, supero-nasal 8/20/19  Referred by Dr Naranjo, worsening vision for the past 6-9 months left eye     HPI: History of Pseudoexfoliation glaucoma.  Poor vision since cataract extraction right eye many years ago.  Used vision left eye to read but needed magnification and reading machine for the past 3 years   Saw Dr Zimmerman early August 2019 and had injection of Avastin left eye, elected no treatment for now right eye due to poor visual acuity from glaucoma ( intraocular pressure 24 at that visit left eye )    PAST OCULAR HISTORY/PROCEDURES:  Retinal detachment repair with scleral buckle, left eye 1989 ([facility] in [place])   Surgery with subsequent retinal laser x 2  Glaucoma diagnosed right eye 1989 and left eye 2000  Cataract extraction with intraocular lens    Right eye 1996    Left eye 1997  Selective laser trabeculoplasty (SLT) right eye 2006 ([facility])  Selective laser trabeculoplasty (SLT) both eyes 2010  Baerveldt right eye 2013 ([facility])  Laser both eyes 2016 ([facility])  Selective laser trabeculoplasty (SLT) left eye 2018 ([facility])   Micropulse cyclophotocoagulation left eye 2/19/19  Avastin left eye with Dr Zimmerman    MAXIMUM INTRAOCULAR PRESSURE:  Unknown right eye / 38 left eye     MEDICATIONS:  Combigan both eyes twice a day  Lumigan both eyes at bedtime   changed to Rhopressa 7/22/19 with minimal effectErythromycin three times a day right eye (not using left eye )  artificial tears (Celluvisc) right eye  Preservision     ANTICOAGULANTS/ANTIPLATELETS/FLOMAX: baby aspirin and fish oil    PMH:  Systemic hypertension     FAMILY/SOCIAL HISTORY:       Family history of glaucoma, mother (lived to 106) and 2 brothers     TESTING:  Octopus visual field LVC   Right eye temporal island remains   Left eye dense inferior nasal step and arcuate, superior arcuate, threatened fixation    OCT retinal nerve fiber layer    Right eye thin except nasally   Left eye  superior and inferior thinning, cystoid macular edema left eye     BScan left eye shows scleral buckle     EXAM:  slightly thin central corneal thickness     IMPRESSION:  Pseudoexfoliation glaucoma both eyes, adv   Age related macular degeneration now with macular edema left eye and worsening vision  Intraocular pressure controlled right eye  Intraocular pressure too high left eye     PLAN:  Needs to follow up with [facility] for macular edema with worsening visual acuity   Continue glaucoma drops both eyes  Prednisolone four times a day for 1 week, three times a day for 1 week, twice a day for 1 week, once a day for 1 week, then stop.  Return to clinic 2 weeks    Attending Physician Attestation:  Complete documentation of historical and exam elements from today's encounter can be found in the full encounter summary report (not reduplicated in this progress note). I personally obtained the chief complaint(s) and history of present illness. I confirmed and edited asnecessary the review of systems, past medical/surgical history, family history, social history, and examination findings as documented by others; and I examined the patient myself. I personally reviewed the relevant tests, images, and reports as documented above. I formulated and edited as necessary the assessment and plan and discussed the findings and management plan with the patient and family.  - Diamond Hopper MD 1:21 PM 8/26/2019

## 2019-08-26 NOTE — NURSING NOTE
Chief Complaints and History of Present Illnesses   Patient presents with     Post Op (Ophthalmology) Left Eye     Chief Complaint(s) and History of Present Illness(es)     Post Op (Ophthalmology) Left Eye     Laterality: left eye    Onset: 6 days ago    Associated symptoms: Negative for flashes and floaters    Pain scale: 0/10              Comments     S/p Schocket left eye with corneal patch graft, supero-nasal 8/20/19  No change in vision noted - pt has a question about upcoming appt with Dr. Stein (had done an injection in the left eye last time)    Ocular meds:  Combigan both eyes twice a day  Lumigan both eyes at bedtime  Erythromycin three times a day right eye (not using left eye)  artificial tears (Celluvisc) right eye  Preservision  Prednisolone QID (on taper) in the left eye  Rhopressa at bedtime in the left eye    Gracie Monique COA COA 12:44 PM August 26, 2019

## 2019-09-09 ENCOUNTER — OFFICE VISIT (OUTPATIENT)
Dept: OPHTHALMOLOGY | Facility: CLINIC | Age: 84
End: 2019-09-09
Attending: OPHTHALMOLOGY
Payer: COMMERCIAL

## 2019-09-09 DIAGNOSIS — Z96.9 PRESENCE OF FUNCTIONAL IMPLANT: ICD-10-CM

## 2019-09-09 DIAGNOSIS — Z48.810 AFTERCARE FOLLOWING SURGERY OF A SENSE ORGAN: Primary | ICD-10-CM

## 2019-09-09 DIAGNOSIS — H40.1433 CAPSULAR GLAUCOMA OF BOTH EYES WITH PSEUDOEXFOLIATION (PXF) OF LENS, SEVERE STAGE: ICD-10-CM

## 2019-09-09 PROCEDURE — G0463 HOSPITAL OUTPT CLINIC VISIT: HCPCS | Mod: ZF

## 2019-09-09 RX ORDER — LATANOPROST 50 UG/ML
1 SOLUTION/ DROPS OPHTHALMIC AT BEDTIME
Qty: 2.5 ML | Refills: 11 | Status: SHIPPED | OUTPATIENT
Start: 2019-09-09 | End: 2020-07-31

## 2019-09-09 ASSESSMENT — REFRACTION_WEARINGRX
OS_AXIS: 155
OD_AXIS: 145
OS_SPHERE: -1.50
OD_ADD: +2.50
OD_SPHERE: -1.25
OS_CYLINDER: +1.00
OD_CYLINDER: +1.00
OS_ADD: +2.50

## 2019-09-09 ASSESSMENT — VISUAL ACUITY
METHOD: SNELLEN - LINEAR
OD_CC: 20/500 ECC
OS_CC: 20/400 ECC

## 2019-09-09 ASSESSMENT — TONOMETRY
OS_IOP_MMHG: 30
OD_IOP_MMHG: 16
IOP_METHOD: APPLANATION

## 2019-09-09 ASSESSMENT — SLIT LAMP EXAM - LIDS
COMMENTS: NORMAL
COMMENTS: NORMAL

## 2019-09-09 NOTE — PROGRESS NOTES
CC: S/p Schocket left eye with corneal patch graft, supero-nasal 8/20/19 Postoperative week #3  Referred by Dr Naranjo, worsening vision for the past 6-9 months left eye     HPI: History of Pseudoexfoliation glaucoma.  Poor vision since cataract extraction right eye many years ago.  Used vision left eye to read but needed magnification and reading machine for the past 3 years   Saw Dr Zimmerman early August 2019 and had injection of Avastin left eye, elected no treatment for now right eye due to poor visual acuity from glaucoma ( intraocular pressure 24 at that visit left eye )    Interval Hx: S/p Schocket left eye with corneal patch graft 8/20/19. States eye has been comfortable but the vision has become increasingly cloudy over the last couple days. Recently seen by [facility] ~10 days ago and had intravitreal injection at that time. Vision improved after injection but now has become cloudy again. Plan for f/u at [facility] in 2 months.    PAST OCULAR HISTORY/PROCEDURES:  Retinal detachment repair with scleral buckle, left eye 1989 ([facility] in [place])   Surgery with subsequent retinal laser x 2  Glaucoma diagnosed right eye 1989 and left eye 2000  Cataract extraction with intraocular lens    Right eye 1996    Left eye 1997  Selective laser trabeculoplasty (SLT) right eye 2006 ([facility])  Selective laser trabeculoplasty (SLT) both eyes 2010  Baerveldt right eye 2013 ([facility])  Laser both eyes 2016 ([facility])  Selective laser trabeculoplasty (SLT) left eye 2018 ([facility])   Micropulse cyclophotocoagulation left eye 2/19/19  Avastin left eye with Dr Zimmerman    MAXIMUM INTRAOCULAR PRESSURE:  Unknown right eye / 38 left eye     MEDICATIONS:  Combigan both eyes twice a day (makes her sleepy)  Lumigan both eyes at bedtime (changed to latanoprost 9/9/19 for cost   changed to Rhopressa 7/22/19 with minimal effectErythromycin two times a day right eye (not using left eye )  artificial tears (Celluvisc) right eye  Preservision    Prednisolone left eye twice a day    ANTICOAGULANTS/ANTIPLATELETS/FLOMAX: baby aspirin and fish oil    PMH:  Systemic hypertension     FAMILY/SOCIAL HISTORY:       Family history of glaucoma, mother (lived to 106) and 2 brothers     TESTING:  Octopus visual field LVC   Right eye temporal island remains   Left eye dense inferior nasal step and arcuate, superior arcuate, threatened fixation    OCT retinal nerve fiber layer    Right eye thin except nasally   Left eye superior and inferior thinning, cystoid macular edema left eye     BScan left eye shows scleral buckle     EXAM:  slightly thin central corneal thickness     IMPRESSION:  Pseudoexfoliation glaucoma both eyes, adv   Age-related macular degeneration now with macular edema left eye and worsening vision  Intraocular pressure controlled right eye  Intraocular pressure too high left eye    PLAN:  Continue follow up with [facility] for macular edema with worsening visual acuity left eye  Continue glaucoma drops both eyes  Continue Prednisolone taper -  twice a day for 2 more days then once a day for 1 week, then stop.  Return to clinic 3-4 weeks for IOP check     Attending Physician Attestation:  Complete documentation of historical and exam elements from today's encounter can be found in the full encounter summary report (not reduplicated in this progress note). I personally obtained the chief complaint(s) and history of present illness. I confirmed and edited asnecessary the review of systems, past medical/surgical history, family history, social history, and examination findings as documented by others; and I examined the patient myself. I personally reviewed the relevant tests, images, and reports as documented above. I formulated and edited as necessary the assessment and plan and discussed the findings and management plan with the patient and family.  - Diamond Hopper MD 2:08 PM 9/9/2019

## 2019-09-30 ENCOUNTER — OFFICE VISIT (OUTPATIENT)
Dept: OPHTHALMOLOGY | Facility: CLINIC | Age: 84
End: 2019-09-30
Attending: OPHTHALMOLOGY
Payer: COMMERCIAL

## 2019-09-30 DIAGNOSIS — Z48.810 AFTERCARE FOLLOWING SURGERY OF A SENSE ORGAN: ICD-10-CM

## 2019-09-30 DIAGNOSIS — H40.1433 CAPSULAR GLAUCOMA OF BOTH EYES WITH PSEUDOEXFOLIATION (PXF) OF LENS, SEVERE STAGE: Primary | ICD-10-CM

## 2019-09-30 PROCEDURE — G0463 HOSPITAL OUTPT CLINIC VISIT: HCPCS | Mod: ZF

## 2019-09-30 RX ORDER — TIMOLOL MALEATE 5 MG/ML
1 SOLUTION/ DROPS OPHTHALMIC EVERY MORNING
Qty: 1 BOTTLE | Refills: 11 | Status: SHIPPED | OUTPATIENT
Start: 2019-09-30 | End: 2020-05-19

## 2019-09-30 ASSESSMENT — TONOMETRY
OS_IOP_MMHG: 8
IOP_METHOD: APPLANATION
OD_IOP_MMHG: 14

## 2019-09-30 ASSESSMENT — REFRACTION_WEARINGRX
OD_AXIS: 145
OS_ADD: +2.50
OS_CYLINDER: +1.00
OD_SPHERE: -1.25
OD_CYLINDER: +1.00
OS_SPHERE: -1.50
OS_AXIS: 155
OD_ADD: +2.50

## 2019-09-30 ASSESSMENT — VISUAL ACUITY
CORRECTION_TYPE: GLASSES
METHOD: SNELLEN - LINEAR
OS_CC: 20/500 ECC
OD_CC: 20/500 ECC

## 2019-09-30 ASSESSMENT — CONF VISUAL FIELD
OS_INFERIOR_NASAL_RESTRICTION: 1
OD_SUPERIOR_NASAL_RESTRICTION: 1
OD_INFERIOR_NASAL_RESTRICTION: 1
OS_SUPERIOR_NASAL_RESTRICTION: 1
OD_INFERIOR_TEMPORAL_RESTRICTION: 1
OS_INFERIOR_TEMPORAL_RESTRICTION: 2
OD_SUPERIOR_TEMPORAL_RESTRICTION: 2
METHOD: COUNTING FINGERS
OS_SUPERIOR_TEMPORAL_RESTRICTION: 2

## 2019-09-30 ASSESSMENT — SLIT LAMP EXAM - LIDS
COMMENTS: NORMAL
COMMENTS: NORMAL

## 2019-09-30 NOTE — PROGRESS NOTES
CC: S/p Schocket left eye with corneal patch graft, supero-nasal 8/20/19 Postoperative week #6  Referred by Dr Naranjo, worsening vision for the past 6-9 months left eye     HPI: History of Pseudoexfoliation glaucoma.  Poor vision since cataract extraction right eye many years ago.  Used vision left eye to read but needed magnification and reading machine for the past 3 years   Saw Dr Zimmerman early August 2019 and had injection of Avastin left eye, elected no treatment for now right eye due to poor visual acuity from glaucoma ( intraocular pressure 24 at that visit left eye )    Interval Hx: S/p Schocket left eye with corneal patch graft 8/20/19. States eye has been comfortable but the vision has become increasingly cloudy over the last couple days. Recently seen by [facility] ~10 days ago and had intravitreal injection at that time. Vision improved after injection but now has become cloudy again. Plan for f/u at [facility] in 2 months.    PAST OCULAR HISTORY/PROCEDURES:  Retinal detachment repair with scleral buckle, left eye 1989 ([facility] in [place])   Surgery with subsequent retinal laser x 2  Glaucoma diagnosed right eye 1989 and left eye 2000  Cataract extraction with intraocular lens    Right eye 1996    Left eye 1997  Selective laser trabeculoplasty (SLT) right eye 2006 ([facility])  Selective laser trabeculoplasty (SLT) both eyes 2010  Baerveldt right eye 2013 ([facility])  Laser both eyes 2016 ([facility])  Selective laser trabeculoplasty (SLT) left eye 2018 ([facility])   Micropulse cyclophotocoagulation left eye 2/19/19  Avastin left eye with Dr Zimmerman    MAXIMUM INTRAOCULAR PRESSURE:  Unknown right eye / 38 left eye     MEDICATIONS:  Combigan both eyes twice a day (makes her sleepy) so changed to timolol both eyes once a day   Lumigan both eyes at bedtime (changed to latanoprost 9/9/19 for cost)  Erythromycin two times a day right eye (not using left eye )  artificial tears (Celluvisc) right eye  Preservision      Minimal effect from Rhopressa, intolerant of dorzolamide-timolol     ANTICOAGULANTS/ANTIPLATELETS/FLOMAX: baby aspirin and fish oil    PMH:  Systemic hypertension     FAMILY/SOCIAL HISTORY:       Family history of glaucoma, mother (lived to 106) and 2 brothers     TESTING:  Octopus visual field LVC   Right eye temporal island remains   Left eye dense inferior nasal step and arcuate, superior arcuate, threatened fixation    OCT retinal nerve fiber layer    Right eye thin except nasally   Left eye superior and inferior thinning, cystoid macular edema left eye     BScan left eye shows scleral buckle     EXAM:  slightly thin central corneal thickness     IMPRESSION:  Pseudoexfoliation glaucoma both eyes, adv   Age-related macular degeneration now with macular edema left eye and worsening vision  Intraocular pressure controlled right eye  Intraocular pressure much better today left eye - assume tube opened    PLAN:  Continue follow up with [facility] for macular edema with worsening visual acuity left eye  Try stopping combigan (sleepy) and use timolol both eyes every morning instead  Prefers bland ointment to erythromycin    Return to clinic4-6  weeks for IOP check     Attending Physician Attestation:  Complete documentation of historical and exam elements from today's encounter can be found in the full encounter summary report (not reduplicated in this progress note). I personally obtained the chief complaint(s) and history of present illness. I confirmed and edited asnecessary the review of systems, past medical/surgical history, family history, social history, and examination findings as documented by others; and I examined the patient myself. I personally reviewed the relevant tests, images, and reports as documented above. I formulated and edited as necessary the assessment and plan and discussed the findings and management plan with the patient and family.  - Diamond Hopper MD 10:52 AM  9/30/2019

## 2019-09-30 NOTE — NURSING NOTE
Chief Complaints and History of Present Illnesses   Patient presents with     Post Op (Ophthalmology) Left Eye     3 week follow-up  S/p Schocket left eye with corneal patch graft, supero-nasal 8/20/19      Chief Complaint(s) and History of Present Illness(es)     Post Op (Ophthalmology) Left Eye     Comments: 3 week follow-up  S/p Schocket left eye with corneal patch graft, supero-nasal 8/20/19               Comments     Pt unsure of any significant vision changes in either eye since last visit.  Denies any pain, pressure, irritation, discharge, and tearing.  Finished up Rhopressa about a week ago.  Currently using combigan BID BE and Lumigan at bedtime BE.    Tiffanie Stewart OT 9:55 AM September 30, 2019

## 2019-11-11 ENCOUNTER — OFFICE VISIT (OUTPATIENT)
Dept: OPHTHALMOLOGY | Facility: CLINIC | Age: 84
End: 2019-11-11
Attending: OPHTHALMOLOGY
Payer: COMMERCIAL

## 2019-11-11 DIAGNOSIS — H40.1433 CAPSULAR GLAUCOMA OF BOTH EYES WITH PSEUDOEXFOLIATION (PXF) OF LENS, SEVERE STAGE: Primary | ICD-10-CM

## 2019-11-11 PROCEDURE — G0463 HOSPITAL OUTPT CLINIC VISIT: HCPCS | Mod: ZF

## 2019-11-11 ASSESSMENT — CONF VISUAL FIELD
OD_INFERIOR_NASAL_RESTRICTION: 1
OD_SUPERIOR_NASAL_RESTRICTION: 1
METHOD: COUNTING FINGERS
OD_SUPERIOR_TEMPORAL_RESTRICTION: 3
OS_INFERIOR_TEMPORAL_RESTRICTION: 3
OS_INFERIOR_NASAL_RESTRICTION: 1
OS_SUPERIOR_NASAL_RESTRICTION: 3
OD_INFERIOR_TEMPORAL_RESTRICTION: 3
OS_SUPERIOR_TEMPORAL_RESTRICTION: 3

## 2019-11-11 ASSESSMENT — VISUAL ACUITY
OD_CC: 20/600 ECC
CORRECTION_TYPE: GLASSES
METHOD: SNELLEN - LINEAR
OS_CC: 20/400 ECC

## 2019-11-11 ASSESSMENT — TONOMETRY
OS_IOP_MMHG: 16
OD_IOP_MMHG: 15
IOP_METHOD: APPLANATION

## 2019-11-11 ASSESSMENT — SLIT LAMP EXAM - LIDS
COMMENTS: NORMAL
COMMENTS: NORMAL

## 2019-11-11 NOTE — PROGRESS NOTES
CC: S/p Schocket left eye with corneal patch graft, supero-nasal 8/20/19   Referred by Dr Naranjo, worsening vision for the past 6-9 months left eye     HPI: History of Pseudoexfoliation glaucoma.  Poor vision since cataract extraction right eye many years ago.  Used vision left eye to read but needed magnification and reading machine for the past 3 years   Saw Dr Zimmerman early August 2019 and had injection of Avastin left eye, elected no treatment for now right eye due to poor visual acuity from glaucoma ( intraocular pressure 24 at that visit left eye )    Interval Hx: S/p Schocket left eye with corneal patch graft 8/20/19. States eye has been comfortable but the vision has become increasingly cloudy over the last couple days. Recently seen by [facility] ~10 days ago and had intravitreal injection at that time. Vision improved after injection but now has become cloudy again. Plan for f/u at [facility] in 2 months.    PAST OCULAR HISTORY/PROCEDURES:  Retinal detachment repair with scleral buckle, left eye 1989 ([facility] in [place])   Surgery with subsequent retinal laser x 2  Glaucoma diagnosed right eye 1989 and left eye 2000  Cataract extraction with intraocular lens    Right eye 1996    Left eye 1997  Selective laser trabeculoplasty (SLT) right eye 2006 ([facility])  Selective laser trabeculoplasty (SLT) both eyes 2010  Baerveldt right eye 2013 ([facility])  Laser both eyes 2016 ([facility])  Selective laser trabeculoplasty (SLT) left eye 2018 ([facility])   Micropulse cyclophotocoagulation left eye 2/19/19  Schocket left eye with corneal patch graft, supero-nasal 8/20/19  Avastin left eye with Dr Zimmerman    MAXIMUM INTRAOCULAR PRESSURE:  Unknown right eye / 38 left eye     MEDICATIONS:  Timolol both eyes once a day (9/30/19-changed from Combigan)  Lumigan both eyes at bedtime (changed to latanoprost 9/9/19 for cost)  Erythromycin two times a day right eye (not using left eye )  artificial tears (Celluvisc) right  eye  Preservision     Minimal effect from Rhopressa, intolerant of dorzolamide-timolol   Intolerant Combigan (makes her sleepy)    ANTICOAGULANTS/ANTIPLATELETS/FLOMAX: baby aspirin and fish oil    PMH:  Systemic hypertension     FAMILY/SOCIAL HISTORY:       Family history of glaucoma, mother (lived to 106) and 2 brothers     TESTING:  Octopus visual field LVC   Right eye temporal island remains   Left eye dense inferior nasal step and arcuate, superior arcuate, threatened fixation    OCT retinal nerve fiber layer    Right eye thin except nasally   Left eye superior and inferior thinning, cystoid macular edema left eye     BScan left eye shows scleral buckle     EXAM:  slightly thin central corneal thickness     IMPRESSION:  Pseudoexfoliation glaucoma both eyes, adv   Age-related macular degeneration now with macular edema left eye and worsening vision  Intraocular pressure controlled right eye  Intraocular pressure much better today left eye - assume tube opened    PLAN:  Continue follow up with [facility] for macular edema with worsening visual acuity left eye  Feels much better without Combigan (brimonidine), still on timolol   Prefers bland ointment to erythromycin    Return to clinic for repeat LVC both eyes (new baseline left eye) in 4 months    Attending Physician Attestation:  Complete documentation of historical and exam elements from today's encounter can be found in the full encounter summary report (not reduplicated in this progress note). I personally obtained the chief complaint(s) and history of present illness. I confirmed and edited asnecessary the review of systems, past medical/surgical history, family history, social history, and examination findings as documented by others; and I examined the patient myself. I personally reviewed the relevant tests, images, and reports as documented above. I formulated and edited as necessary the assessment and plan and discussed the findings and management plan with  the patient and family.  - Diamond Hopper MD 11:38 AM 11/11/2019

## 2019-11-11 NOTE — NURSING NOTE
Chief Complaints and History of Present Illnesses   Patient presents with     Follow Up     6 week follow-up Pseudoexfoliation glaucoma, both eyes. S/p Schocket left eye with corneal patch graft, supero-nasal 8/20/19      Chief Complaint(s) and History of Present Illness(es)     Follow Up     Laterality: both eyes    Onset: gradual    Quality: blurred vision    Severity: severe    Frequency: constantly    Course: stable    Associated symptoms: Negative for eye pain, dryness, redness and tearing    Treatments tried: eye drops    Pain scale: 0/10    Comments: 6 week follow-up Pseudoexfoliation glaucoma, both eyes. S/p Schocket left eye with corneal patch graft, supero-nasal 8/20/19               Comments     Pt denies any significant vision changes in either eye since last visit.  Denies any pain, pressure, irritation, discharge, and tearing.  Notes she only using Timolol qam BE and Latanoprost at bedtime BE.    Tiffanie Stewart OT 10:10 AM November 11, 2019

## 2020-02-10 ENCOUNTER — TELEPHONE (OUTPATIENT)
Dept: OPHTHALMOLOGY | Facility: CLINIC | Age: 85
End: 2020-02-10

## 2020-02-10 NOTE — TELEPHONE ENCOUNTER
Pt feels generic Timolol is hurting her vision and would like the name brand prescribed.    Gregoryp

## 2020-02-10 NOTE — TELEPHONE ENCOUNTER
[facility] Call Center    Phone Message    May a detailed message be left on voicemail: yes     Reason for Call: Medication Question or concern regarding medication   Prescription Clarification  Name of Medication: timolol maleate (TIMOPTIC) 0.5 % ophthalmic solution  Prescribing Provider: Dr. Diamond Hopper   Pharmacy: [id number] - [place], [place] - [address] AT [address] & [address]   What on the order needs clarification? Per pt, believes generic is negatively impacting vision and would like to be prescribed name brand. Please call pt back to discuss.          Action Taken: Message routed to:  [facility] ([facility]):  eye    Travel Screening: Not Applicable

## 2020-02-12 NOTE — TELEPHONE ENCOUNTER
Spoke with patient about concern regarding generic timolol contributing to gradually decreasing vision. 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 States IOP at recent visit with Dr Zimmerman was about 15 each eye. We discussed that generic timolol would be unlikely to contribute to decreased vision so long as her IOP is controlled. She appreciated the callback very much. She will keep her appointment next month with Dr Hopper and call back for any interval concerns.    Joe Zheng MD  Department of Ophthalmology - PGY3

## 2020-03-18 ENCOUNTER — TELEPHONE (OUTPATIENT)
Dept: OPHTHALMOLOGY | Facility: CLINIC | Age: 85
End: 2020-03-18

## 2020-03-18 NOTE — TELEPHONE ENCOUNTER
COVID-19 RESCHEDULES    Date contacted: March 18, 2020 3:14 PM    Type of contact: Spoke with patient    Current appointment date:   Mon, 3/23 @ 12:15pm    Attending provider: Ward    Current Eye Symptoms: slight dimming vision    Refills needed: none    Best contact for rescheduling: listed number    Best date/time for rescheduling: anytime    Next appt with Dr. Zimmerman is April 1, was told no more than retina can do for macular degeneration.    Huma GARCIA 3:14 PM March 18, 2020

## 2020-05-19 ENCOUNTER — TELEPHONE (OUTPATIENT)
Dept: OPHTHALMOLOGY | Facility: CLINIC | Age: 85
End: 2020-05-19

## 2020-05-19 DIAGNOSIS — H40.1433 CAPSULAR GLAUCOMA OF BOTH EYES WITH PSEUDOEXFOLIATION (PXF) OF LENS, SEVERE STAGE: ICD-10-CM

## 2020-05-19 RX ORDER — TIMOLOL MALEATE 5 MG/ML
1 SOLUTION/ DROPS OPHTHALMIC EVERY MORNING
Qty: 1 BOTTLE | Refills: 11 | Status: SHIPPED | OUTPATIENT
Start: 2020-05-19

## 2020-05-19 NOTE — TELEPHONE ENCOUNTER
Previously reviewed with [facility] pharmacy who have in stock and able to get more    Rx sent and pt aware and number provided for delivery options    Robert Grier RN 5:09 PM 05/19/20

## 2020-07-31 DIAGNOSIS — H40.1433 CAPSULAR GLAUCOMA OF BOTH EYES WITH PSEUDOEXFOLIATION (PXF) OF LENS, SEVERE STAGE: ICD-10-CM

## 2020-07-31 RX ORDER — LATANOPROST 50 UG/ML
1 SOLUTION/ DROPS OPHTHALMIC AT BEDTIME
Qty: 2.5 ML | Refills: 5 | Status: SHIPPED | OUTPATIENT
Start: 2020-07-31

## 2020-07-31 NOTE — TELEPHONE ENCOUNTER
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, severe stage       Requested directions: Place 1 drop into both eyes At Bedtime - Both Eyes   Current directions on the medication list: Place 1 drop into both eyes At Bedtime - Both Eyes     Last Written Prescription Date:   9/9/2019  Last Fill Quantity: 2.5,   # refills: 11    Last Office Visit: 11/11/2019  Future Office visit: None     Attending Provider:  Diamond Hopper MD Ophthalmology     Last Clinic Note: 11/11/2019    PLAN:  Continue follow up with VRS for macular edema with worsening visual acuity left eye  Feels much better without Combigan (brimonidine), still on timolol   Prefers bland ointment to erythromycin     Return to clinic for repeat LVC both eyes (new baseline left eye) in 4 months    Routing refill request to provider for review/approval because:  Pt asked to follow up in 4 months for baseline for left eye.   No follow up appointment scheduled.    Pt still needing this visit?      Refer to clinic for review    Ifeoma Ferreira RN  Central Triage Red Flags/Med Refills

## 2021-02-26 ENCOUNTER — IMMUNIZATION (OUTPATIENT)
Dept: NURSING | Facility: CLINIC | Age: 86
End: 2021-02-26
Payer: COMMERCIAL

## 2021-02-26 PROCEDURE — 0001A PR COVID VAC PFIZER DIL RECON 30 MCG/0.3 ML IM: CPT

## 2021-02-26 PROCEDURE — 91300 PR COVID VAC PFIZER DIL RECON 30 MCG/0.3 ML IM: CPT

## 2021-03-07 ENCOUNTER — HEALTH MAINTENANCE LETTER (OUTPATIENT)
Age: 86
End: 2021-03-07

## 2021-03-19 ENCOUNTER — IMMUNIZATION (OUTPATIENT)
Dept: NURSING | Facility: CLINIC | Age: 86
End: 2021-03-19
Attending: INTERNAL MEDICINE
Payer: COMMERCIAL

## 2021-03-19 PROCEDURE — 0002A PR COVID VAC PFIZER DIL RECON 30 MCG/0.3 ML IM: CPT

## 2021-03-19 PROCEDURE — 91300 PR COVID VAC PFIZER DIL RECON 30 MCG/0.3 ML IM: CPT

## 2021-10-10 ENCOUNTER — HEALTH MAINTENANCE LETTER (OUTPATIENT)
Age: 86
End: 2021-10-10

## 2022-03-26 ENCOUNTER — HEALTH MAINTENANCE LETTER (OUTPATIENT)
Age: 87
End: 2022-03-26

## 2022-09-18 ENCOUNTER — HEALTH MAINTENANCE LETTER (OUTPATIENT)
Age: 87
End: 2022-09-18

## 2023-05-07 ENCOUNTER — HEALTH MAINTENANCE LETTER (OUTPATIENT)
Age: 88
End: 2023-05-07

## (undated) DEVICE — BLADE KNIFE BEAVER MICROSHARP GREEN 377515

## (undated) DEVICE — SYR 01ML 27GA 0.5" NDL TBC 309623

## (undated) DEVICE — SU VICRYL 7-0 TG140-8DA 18" J546G

## (undated) DEVICE — EYE DRSG PAD OVAL

## (undated) DEVICE — NDL 25GA 1.5" 305127

## (undated) DEVICE — EYE TIP BIPOLAR 18GA ERASER 221250

## (undated) DEVICE — ESU CORD BIPOLAR GREEN 10-4000

## (undated) DEVICE — PACK CATARACT CUSTOM ASC SEY15CPUMC

## (undated) DEVICE — SU VICRYL 9-0 BV100-3 5" V402G

## (undated) DEVICE — EYE PREP BETADINE 5% SOLUTION 30ML 0065-0411-30

## (undated) DEVICE — LINEN TOWEL PACK X5 5464

## (undated) DEVICE — NDL 30GA 0.5" 305106

## (undated) DEVICE — SU PROLENE 9-0 TG140-8DA 6" 1754G

## (undated) DEVICE — GLOVE PROTEXIS MICRO 6.5  2D73PM65

## (undated) DEVICE — EYE NDL RETROBULBAR 25GA 60-111637

## (undated) DEVICE — SOL WATER IRRIG 500ML BOTTLE 2F7113

## (undated) DEVICE — EYE SPONGE SPEAR WECK CEL 0008685

## (undated) DEVICE — NDL 23GA 1" 305145

## (undated) DEVICE — TAPE MICROPORE 1"X1.5YD 1530S-1

## (undated) DEVICE — EYE SHIELD PLASTIC

## (undated) DEVICE — SYR 05ML LL W/O NDL

## (undated) RX ORDER — PROPOFOL 10 MG/ML
INJECTION, EMULSION INTRAVENOUS
Status: DISPENSED
Start: 2019-08-20

## (undated) RX ORDER — LIDOCAINE HYDROCHLORIDE 20 MG/ML
INJECTION, SOLUTION EPIDURAL; INFILTRATION; INTRACAUDAL; PERINEURAL
Status: DISPENSED
Start: 2019-08-20